# Patient Record
Sex: MALE | Race: WHITE | NOT HISPANIC OR LATINO | Employment: FULL TIME | URBAN - METROPOLITAN AREA
[De-identification: names, ages, dates, MRNs, and addresses within clinical notes are randomized per-mention and may not be internally consistent; named-entity substitution may affect disease eponyms.]

---

## 2018-12-08 ENCOUNTER — RADIANT APPOINTMENT (OUTPATIENT)
Dept: GENERAL RADIOLOGY | Facility: CLINIC | Age: 28
End: 2018-12-08
Attending: PHYSICIAN ASSISTANT
Payer: COMMERCIAL

## 2018-12-08 ENCOUNTER — OFFICE VISIT (OUTPATIENT)
Dept: URGENT CARE | Facility: URGENT CARE | Age: 28
End: 2018-12-08
Payer: COMMERCIAL

## 2018-12-08 VITALS
DIASTOLIC BLOOD PRESSURE: 78 MMHG | TEMPERATURE: 98.8 F | SYSTOLIC BLOOD PRESSURE: 120 MMHG | WEIGHT: 204.7 LBS | HEART RATE: 92 BPM | OXYGEN SATURATION: 95 %

## 2018-12-08 DIAGNOSIS — M25.532 LEFT WRIST PAIN: ICD-10-CM

## 2018-12-08 DIAGNOSIS — S63.502A SPRAIN OF LEFT WRIST, INITIAL ENCOUNTER: Primary | ICD-10-CM

## 2018-12-08 PROCEDURE — 99203 OFFICE O/P NEW LOW 30 MIN: CPT | Performed by: PHYSICIAN ASSISTANT

## 2018-12-08 PROCEDURE — 73110 X-RAY EXAM OF WRIST: CPT | Mod: LT

## 2018-12-08 RX ORDER — CETIRIZINE HYDROCHLORIDE 10 MG/1
10 TABLET ORAL
COMMUNITY
Start: 2016-04-15

## 2018-12-08 NOTE — MR AVS SNAPSHOT
After Visit Summary   12/8/2018    Ric Maza    MRN: [id number]           Patient Information     Date Of Birth          1990        Visit Information        Provider Department      12/8/2018 10:35 AM Elian Felder PA-C [facility]        Today's Diagnoses     Sprain of left wrist, initial encounter    -  1    Left wrist pain          Care Instructions      Wrist Sprain  A sprain is an injury to the ligaments or capsule that holds a joint together. There are no broken bones. Most sprains take about 3 to 6 weeks to heal. If it a severe sprain where the ligament is completely torn, it can take months to recover.  Most wrist sprains are treated with a splint, wrist brace, or elastic wrap for support. Severe sprains may require surgery.  Home care    Keep your arm elevated to reduce pain and swelling. This is very important during the first 48 hours.    Apply an ice pack over the injured area for 15 to 20 minutes every 3 to 6 hours. You should do this for the first 24 to 48 hours. You can make an ice pack by filling a plastic bag that seals at the top with ice cubes and then wrapping it with a thin towel. Continue to use ice packs for relief of pain and swelling as needed. As the ice melts, be careful to avoid getting your wrap, splint, or cast wet. After 48 hours, apply heat (warm shower or warm bath) for 15 to 20 minutes several times a day, or alternate ice and heat.     You may use over-the-counter pain medicine to control pain, unless another pain medicine was prescribed. If you have chronic liver or kidney disease or ever had a stomach ulcer or gastrointestinal bleeding, talk with your doctor before using these medicines.    If you were given a splint or brace, wear it for the time advised by your doctor.  Follow-up care  Follow up with your healthcare provider, or as advised. Any X-rays you had today don t show any broken bones, breaks, or fractures.  Sometimes fractures don t show up on the first X-ray. Bruises and sprains can sometimes hurt as much as a fracture. These injuries can take time to heal completely. If your symptoms don t improve or they get worse, talk with your doctor. You may need a repeat X-ray. If X-rays were taken, you will be told of any new findings that may affect your care.  When to seek medical advice  Call your healthcare provider right away if any of these occur:    Pain or swelling increases    Fingers or hand becomes cold, blue, numb, or tingly   Date Last Reviewed: 5/1/2018 2000-2018 [employer]. [address] [place] [id number]. All rights reserved. This information is not intended as a substitute for professional medical care. Always follow your healthcare professional's instructions.      12/8/18 Urgent Care Plan:       1.  Left Thumb Spica Wrist brace dispensed.  Patient advised to use brace during activity for the next 3-5 days.  Intentionally remove brace frequently throughout the day to work on gentle range of motion exercises.      2. Remove ring from left ring finger. If unable to remove, have it cut off at jeweler over weekend so it does not cause vascular compromise.     2. Rest, ice, elevate     3.  Ibuprofen 400- 600 mg three times daily, with food, for next 3-5 days as tolerated     4.  Follow-up with primary care provider or orthopedic MD if symptoms not significantly improving over the next 3-5 days.  Follow-up sooner if any worsening of symptoms or if any new symptoms develop.     5. Follow-up immediately for medical re-evaluation if you develop any increased pain, redness or swelling.  Follow-up immediately if fingers or hand becomes cold, blue, numb, or tingly.                   Follow-ups after your visit        Who to contact     If you have questions or need follow up information about today's clinic visit or your schedule please contact [facility] directly at  "[phone].  Normal or non-critical lab and imaging results will be communicated to you by [employer], letter or phone within 4 business days after the clinic has received the results. If you do not hear from us within 7 days, please contact the clinic through [employer] or phone. If you have a critical or abnormal lab result, we will notify you by phone as soon as possible.  Submit refill requests through [employer] or call your pharmacy and they will forward the refill request to us. Please allow 3 business days for your refill to be completed.          Additional Information About Your Visit        [employer] Information     [employer] lets you send messages to your doctor, view your test results, renew your prescriptions, schedule appointments and more. To sign up, go to [email or web address] . Click on \"Log in\" on the left side of the screen, which will take you to the Welcome page. Then click on \"Sign up Now\" on the right side of the page.     You will be asked to enter the access code listed below, as well as some personal information. Please follow the directions to create your username and password.     Your access code is: [email or web address]  Expires: 3/8/2019  1:09 PM     Your access code will  in 90 days. If you need help or a new code, please call your [place] clinic or [phone].        Care EveryWhere ID     This is your Care EveryWhere ID. This could be used by other organizations to access your [place] medical records  [id number]        Your Vitals Were     Pulse Temperature Pulse Oximetry             92 98.8  F (37.1  C) (Oral) 95%          Blood Pressure from Last 3 Encounters:   18 120/78    Weight from Last 3 Encounters:   18 204 lb 11.2 oz (92.9 kg)                 Today's Medication Changes          These changes are accurate as of 18  1:09 PM.  If you have any questions, ask your nurse or doctor.               Start taking these medicines.        Dose/Directions    order for DME "   Used for:  Sprain of left wrist, initial encounter, Left wrist pain   Started by:  Elian Felder PA-C        Equipment being ordered: Left Thumb Spica Splint   Quantity:  1 Device   Refills:  0            Where to get your medicines      Some of these will need a paper prescription and others can be bought over the counter.  Ask your nurse if you have questions.     Bring a paper prescription for each of these medications     order for DME                Primary Care Provider Office Phone # Fax #    [facility] [phone]       [facility] [address]        Equal Access to Services     [facility]: Hadii aad ku hadasho Soomaali, waaxda luqadaha, qaybta kaalmada adejosephyada, valerio richardson . So [facility] [phone].    ATENCIÓN: Si habla español, tiene a leavitt disposición servicios gratuitos de asistencia lingüística. [facility] [phone].    We comply with applicable federal civil rights laws and [place] laws. We do not discriminate on the basis of race, color, national origin, age, disability, sex, sexual orientation, or gender identity.            Thank you!     Thank you for choosing [facility]  for your care. Our goal is always to provide you with excellent care. Hearing back from our patients is one way we can continue to improve our services. Please take a few minutes to complete the written survey that you may receive in the mail after your visit with us. Thank you!             Your Updated Medication List - Protect others around you: Learn how to safely use, store and throw away your medicines at www.disposemymeds.org.          This list is accurate as of 12/8/18  1:09 PM.  Always use your most recent med list.                   Brand Name Dispense Instructions for use Diagnosis    cetirizine 10 MG tablet    zyrTEC     Take 10 mg by mouth        order for DME     1 Device    Equipment being ordered: Left Thumb Spica  Splint    Sprain of left wrist, initial encounter, Left wrist pain

## 2018-12-08 NOTE — PROGRESS NOTES
"    SUBJECTIVE:  Ric Maza  is a right hand dominant 28 year old year man  who sustained a left wrist injury approximately 24 hours ago related to a fall on ice.      Denies any LOC. Denies any head or neck sxs. Denies any pain elsewhere.     Mechanism of injury: Patient thinks he landed on arm/wrist. He does not recall trying to \"break\" his fall with an outstretched arm/hand. He has had 6/10 pain at worst (with wrist ROM) since onset of injury. Denies any acute worsening. Denies any redness or bruising.   Symptoms have been unchanged since that time.   Prior history of related problems: no prior problems with this area in the past.  Symptoms exacerbated by: Admits to increased tenderness with ROM wrist   Symptoms relieved by: No pain at rest in neutral position     Specifically denies any cold, blue, icy feeling fingers.  Denies any numbness or tingling of affected upper extremity.     PMHX: Seasonal allergies and Amox/PCN allergy. Denies any other chronic medical problems.     Current Outpatient Prescriptions   Medication     cetirizine (ZYRTEC) 10 MG tablet     No current facility-administered medications for this visit.      Allergies   Allergen Reactions     Amoxicillin      Other reaction(s): *Unknown - Pt Doesn't Remember  Hives     Penicillins      Other reaction(s): *Unknown - Pt Doesn't Remember  Hives         OBJECTIVE:  /78 (BP Location: Right arm, Patient Position: Chair, Cuff Size: Adult Large)  Pulse 92  Temp 98.8  F (37.1  C) (Oral)  Wt 204 lb 11.2 oz (92.9 kg)  SpO2 95%     GENERAL:  Very pleasant, comfortable and generally well appearing.     LET WRIST EXAM: Positive for generalized pain over distal 1/3 of radius. No true point tenderness.First time patient reports mild tenderness in snuffbox. However, on re-exam of snuffbox he has no tenderness.   No Instability of wrist, ligaments intact, FROM all hand, wrist, finger joints.Exam of wrist, hand, digits and forearm, elbow and " "upper arm is otherwise normal.     SKIN: No laceration, abrasion, hematoma, bruising or swelling     NEURO: Sensation intact to light touch along entire UE and into all fingertips today. NEURO: Alert and oriented.  Normal speech and mentation.  CN II/XII grossly intact.  Gait within normal limits.        VASCULAR: No compromise to distal circulation. Brachial and radial pulses are 2+ and equal to that of unaffected extremity. Good/brisk capillary refill to all digits confirmed today.      XR WRIST: I reviewed my impression (No acute fracture. No acute findings), along with actual x-ray images, with patient during his office visit today.  Radiologist over-read pending. Patient will need to be called if there are any acute findings on radiologist over-read.         ASSESSMENT/PLAN:    (D94.766T) Sprain of left wrist, initial encounter  (primary encounter diagnosis)          1.  Left Thumb Spica Wrist brace dispensed.  Patient advised to use brace during activity for the next 3-5 days.  Intentionally remove brace frequently throughout the day to work on gentle ROM exercises.      2. Remove ring from left ring finger. If unable to remove, have it cut off at jeweler over weekend so it does not cause vascular compromise.     2. R.I.C.E     3.  Ibuprofen 600 mg TID, with food, for next 3-5 days as tolerated     4.  Follow-up with primary care provider or orthopedic MD if symptoms not significantly improving over the next 3-5 days.  Follow-up sooner if any worsening of symptoms or if any new symptoms develop.     5. Follow-up immediately for medical re-evaluation if you develop any increased pain, redness or swelling.  Follow-up immediately if fingers or hand becomes cold, blue, numb, or tingly.        In addition to the above, wrist sprain \"red flag\" signs and sxs are reviewed with pt both verbally and by way of printed educational material for home review.  Pt verbalizes understanding of and agrees to the above plan. "       (M25.532) Left wrist pain  Plan: XR Wrist Left G/E 3 Views

## 2018-12-08 NOTE — PATIENT INSTRUCTIONS
Wrist Sprain  A sprain is an injury to the ligaments or capsule that holds a joint together. There are no broken bones. Most sprains take about 3 to 6 weeks to heal. If it a severe sprain where the ligament is completely torn, it can take months to recover.  Most wrist sprains are treated with a splint, wrist brace, or elastic wrap for support. Severe sprains may require surgery.  Home care    Keep your arm elevated to reduce pain and swelling. This is very important during the first 48 hours.    Apply an ice pack over the injured area for 15 to 20 minutes every 3 to 6 hours. You should do this for the first 24 to 48 hours. You can make an ice pack by filling a plastic bag that seals at the top with ice cubes and then wrapping it with a thin towel. Continue to use ice packs for relief of pain and swelling as needed. As the ice melts, be careful to avoid getting your wrap, splint, or cast wet. After 48 hours, apply heat (warm shower or warm bath) for 15 to 20 minutes several times a day, or alternate ice and heat.     You may use over-the-counter pain medicine to control pain, unless another pain medicine was prescribed. If you have chronic liver or kidney disease or ever had a stomach ulcer or gastrointestinal bleeding, talk with your doctor before using these medicines.    If you were given a splint or brace, wear it for the time advised by your doctor.  Follow-up care  Follow up with your healthcare provider, or as advised. Any X-rays you had today don t show any broken bones, breaks, or fractures. Sometimes fractures don t show up on the first X-ray. Bruises and sprains can sometimes hurt as much as a fracture. These injuries can take time to heal completely. If your symptoms don t improve or they get worse, talk with your doctor. You may need a repeat X-ray. If X-rays were taken, you will be told of any new findings that may affect your care.  When to seek medical advice  Call your healthcare provider right  away if any of these occur:    Pain or swelling increases    Fingers or hand becomes cold, blue, numb, or tingly   Date Last Reviewed: 5/1/2018 2000-2018 [employer]. [address] [place] [address], [place] [address]. All rights reserved. This information is not intended as a substitute for professional medical care. Always follow your healthcare professional's instructions.      12/8/18 Urgent Care Plan:       1.  Left Thumb Spica Wrist brace dispensed.  Patient advised to use brace during activity for the next 3-5 days.  Intentionally remove brace frequently throughout the day to work on gentle range of motion exercises.      2. Remove ring from left ring finger. If unable to remove, have it cut off at jeweler over weekend so it does not cause vascular compromise.     2. Rest, ice, elevate     3.  Ibuprofen 400- 600 mg three times daily, with food, for next 3-5 days as tolerated     4.  Follow-up with primary care provider or orthopedic MD if symptoms not significantly improving over the next 3-5 days.  Follow-up sooner if any worsening of symptoms or if any new symptoms develop.     5. Follow-up immediately for medical re-evaluation if you develop any increased pain, redness or swelling.  Follow-up immediately if fingers or hand becomes cold, blue, numb, or tingly.

## 2024-01-29 ENCOUNTER — VIRTUAL VISIT (OUTPATIENT)
Dept: PEDIATRICS | Facility: CLINIC | Age: 34
End: 2024-01-29
Payer: COMMERCIAL

## 2024-01-29 DIAGNOSIS — Z87.09 HISTORY OF ASTHMA: Primary | ICD-10-CM

## 2024-01-29 DIAGNOSIS — R05.2 SUBACUTE COUGH: ICD-10-CM

## 2024-01-29 PROCEDURE — 99203 OFFICE O/P NEW LOW 30 MIN: CPT | Mod: 95 | Performed by: INTERNAL MEDICINE

## 2024-01-29 RX ORDER — INHALER, ASSIST DEVICES
SPACER (EA) MISCELLANEOUS
Qty: 1 EACH | Refills: 0 | Status: SHIPPED | OUTPATIENT
Start: 2024-01-29

## 2024-01-29 RX ORDER — ALBUTEROL SULFATE 90 UG/1
2 AEROSOL, METERED RESPIRATORY (INHALATION) EVERY 6 HOURS PRN
Qty: 18 G | Refills: 1 | Status: SHIPPED | OUTPATIENT
Start: 2024-01-29

## 2024-01-29 NOTE — PATIENT INSTRUCTIONS
Start Pulmicort inhaler 2 puffs twice daily with spacer. Rinse mouth after use. Can add in albuterol if you feel this might be helpful for coughing spasms as well.    Let us know if symptoms aren't improving in a couple of weeks - would recommend coming in for an in-person exam at that time.

## 2024-01-29 NOTE — PROGRESS NOTES
Rom is a 33 year old who is being evaluated via a billable video visit.      How would you like to obtain your AVS? MyChart  If the video visit is dropped, the invitation should be resent by: Text to cell phone: [phone]  Will anyone else be joining your video visit? No          Assessment & Plan     History of asthma  Subacute cough  Anticipate patient may have post-viral cough exacerbated by hx of asthma/reactive airway dx. Discussed steroid burst vs ICS with prn albuterol inhaler; he would like to try ICS and may consider adding in albuterol inhaler as well. Reviewed ICS use technique, follow-up if symptoms not improving in 2 weeks.   - budesonide (PULMICORT FLEXHALER) 90 MCG/ACT inhaler; Inhale 2 puffs into the lungs 2 times daily  - spacer (OPTICHAMBER BASILIA) holding chamber; Use with inhalers  - albuterol (PROAIR HFA/PROVENTIL HFA/VENTOLIN HFA) 108 (90 Base) MCG/ACT inhaler; Inhale 2 puffs into the lungs every 6 hours as needed for shortness of breath, wheezing or cough          Patient Instructions   Start Pulmicort inhaler 2 puffs twice daily with spacer. Rinse mouth after use. Can add in albuterol if you feel this might be helpful for coughing spasms as well.    Let us know if symptoms aren't improving in a couple of weeks - would recommend coming in for an in-person exam at that time.     Subjective   Rom is a 33 year old, presenting for the following health issues:  No chief complaint on file.    HPI     Jon calls in to discuss ongoing cough symptoms. Got sick in December (COVID negative) with a cold or RSV. Has had a cough throughout Jan that he can't shake. Pretty phlegmy throat, and has been wheezing a bit as well. Hx of allergy related asthma as a teenager. No fevers.         Review of Systems  Constitutional, HEENT, cardiovascular, pulmonary, gi and gu systems are negative, except as otherwise noted.      Objective           Vitals:  No vitals were obtained today due to virtual  visit.    Physical Exam   GENERAL: alert and no distress  EYES: Eyes grossly normal to inspection.  No discharge or erythema, or obvious scleral/conjunctival abnormalities.  RESP: No audible wheeze, cough, or visible cyanosis.    SKIN: Visible skin clear. No significant rash, abnormal pigmentation or lesions.  NEURO: Cranial nerves grossly intact.  Mentation and speech appropriate for age.  PSYCH: Appropriate affect, tone, and pace of words          Video-Visit Details    Type of service:  Video Visit     Originating Location (pt. Location): Home    Distant Location (provider location):  On-site  Platform used for Video Visit: Dana  Signed Electronically by: Stacey Lew MD

## 2024-02-06 ENCOUNTER — TELEPHONE (OUTPATIENT)
Dept: PEDIATRICS | Facility: CLINIC | Age: 34
End: 2024-02-06
Payer: COMMERCIAL

## 2024-02-06 DIAGNOSIS — R05.2 SUBACUTE COUGH: ICD-10-CM

## 2024-02-06 DIAGNOSIS — Z87.09 HISTORY OF ASTHMA: ICD-10-CM

## 2024-02-06 NOTE — TELEPHONE ENCOUNTER
Alternative requested from [facility]    Drug: PULMICORT 90 MCG FLEXHALER     Message : ALTERATIVE REQUESTED : NOT ABLE TO GET THIS IN STOCK FROM  PLEASE CONSIDER AN ALTERNATIVE.    Arleen Hernandes on 2/6/2024 at 12:19 PM

## 2024-02-07 NOTE — TELEPHONE ENCOUNTER
Patient will call insurance company to find out what alternative to Pulmicort is covered by his plan.    He will send us "[employer] message or call.    Silva Mcmahan RN

## 2024-03-17 ENCOUNTER — HEALTH MAINTENANCE LETTER (OUTPATIENT)
Age: 34
End: 2024-03-17

## 2025-03-22 ENCOUNTER — HEALTH MAINTENANCE LETTER (OUTPATIENT)
Age: 35
End: 2025-03-22